# Patient Record
Sex: FEMALE | Race: WHITE | NOT HISPANIC OR LATINO | Employment: UNEMPLOYED | URBAN - METROPOLITAN AREA
[De-identification: names, ages, dates, MRNs, and addresses within clinical notes are randomized per-mention and may not be internally consistent; named-entity substitution may affect disease eponyms.]

---

## 2019-08-26 ENCOUNTER — TELEPHONE (OUTPATIENT)
Dept: OBGYN CLINIC | Facility: MEDICAL CENTER | Age: 29
End: 2019-08-26

## 2019-08-26 NOTE — TELEPHONE ENCOUNTER
----- Message from Lara Coyle sent at 8/23/2019  1:25 PM EDT -----  Regarding: OB Transfer 35 wks  Contact: [phone]  Pt called that she wants a second opinion on her pregnancy  She apparently was diagnosed with cholestasis and was told she needs to be induced at 36 wks  She would like a second thought on this  Please call her when you can Monday morning  Thank you!

## 2019-08-26 NOTE — TELEPHONE ENCOUNTER
Patient states she is "almost 36 weeks" and wants second opinion regarding cholestasis in pregnancy and early induction at 36 weeks    Advised that records and labs need to be faxed and received, would not be able to schedule an appointment in time for patient to be seen before induction date